# Patient Record
Sex: FEMALE | Employment: UNEMPLOYED | URBAN - METROPOLITAN AREA
[De-identification: names, ages, dates, MRNs, and addresses within clinical notes are randomized per-mention and may not be internally consistent; named-entity substitution may affect disease eponyms.]

---

## 2017-07-15 ENCOUNTER — TRANSFERRED RECORDS (OUTPATIENT)
Dept: HEALTH INFORMATION MANAGEMENT | Facility: CLINIC | Age: 62
End: 2017-07-15

## 2017-08-04 ENCOUNTER — TRANSFERRED RECORDS (OUTPATIENT)
Dept: HEALTH INFORMATION MANAGEMENT | Facility: CLINIC | Age: 62
End: 2017-08-04

## 2017-09-07 ENCOUNTER — TRANSFERRED RECORDS (OUTPATIENT)
Dept: HEALTH INFORMATION MANAGEMENT | Facility: CLINIC | Age: 62
End: 2017-09-07

## 2017-10-19 ENCOUNTER — TRANSFERRED RECORDS (OUTPATIENT)
Dept: HEALTH INFORMATION MANAGEMENT | Facility: CLINIC | Age: 62
End: 2017-10-19

## 2018-02-02 ENCOUNTER — TRANSFERRED RECORDS (OUTPATIENT)
Dept: HEALTH INFORMATION MANAGEMENT | Facility: CLINIC | Age: 63
End: 2018-02-02

## 2018-02-14 ENCOUNTER — TRANSFERRED RECORDS (OUTPATIENT)
Dept: HEALTH INFORMATION MANAGEMENT | Facility: CLINIC | Age: 63
End: 2018-02-14

## 2018-04-17 ENCOUNTER — TRANSFERRED RECORDS (OUTPATIENT)
Dept: HEALTH INFORMATION MANAGEMENT | Facility: CLINIC | Age: 63
End: 2018-04-17

## 2018-04-18 ENCOUNTER — TRANSFERRED RECORDS (OUTPATIENT)
Dept: HEALTH INFORMATION MANAGEMENT | Facility: CLINIC | Age: 63
End: 2018-04-18

## 2018-05-17 ENCOUNTER — TRANSFERRED RECORDS (OUTPATIENT)
Dept: HEALTH INFORMATION MANAGEMENT | Facility: CLINIC | Age: 63
End: 2018-05-17

## 2018-06-05 NOTE — TELEPHONE ENCOUNTER
FUTURE VISIT INFORMATION      FUTURE VISIT INFORMATION:    Date: 8/9/18    Time: 0800    Location: [facility]TH[facility]  REFERRAL INFORMATION:    Referring provider:  CELIO    Referring providers clinic:  [facility]    Reason for visit/diagnosis  SI JOINT       RECORDS STATUS      RECORDS RECEIVED FROM: [facility]   DATE RECEIVED: 6/5/18   NOTES STATUS DETAILS   OFFICE NOTE from referring provider Received 5/17/18*3/15/18*   OFFICE NOTE from other specialist Received 2/1/18*9/7/17*7/28/17*5/31/17*   DISCHARGE SUMMARY from hospital N/A    DISCHARGE REPORT from the ER N/A    OPERATIVE REPORT N/A    MEDICATION LIST Received    IMPLANT RECORD/STICKER N/A    LABS     CBC/DIFF N/A    CULTURES N/A    DEXA REQUESTED 4/17/18   MRI REQUESTED 8/4/17*7/15/17*6/24/13*   CT SCAN N/A    XRAYS (IMAGES & REPORTS) REQUESTED  9/7/17*6/18/13*   TUMOR     PATHOLOGY  Slides & report N/A

## 2018-08-03 DIAGNOSIS — M53.3 SACROILIAC JOINT PAIN: Primary | ICD-10-CM

## 2018-08-03 DIAGNOSIS — M40.209 KYPHOSIS: ICD-10-CM

## 2018-08-09 ENCOUNTER — RADIANT APPOINTMENT (OUTPATIENT)
Dept: GENERAL RADIOLOGY | Facility: CLINIC | Age: 63
End: 2018-08-09
Attending: ORTHOPAEDIC SURGERY
Payer: MEDICAID

## 2018-08-09 ENCOUNTER — OFFICE VISIT (OUTPATIENT)
Dept: ORTHOPEDICS | Facility: CLINIC | Age: 63
End: 2018-08-09
Payer: MEDICAID

## 2018-08-09 ENCOUNTER — PRE VISIT (OUTPATIENT)
Dept: ORTHOPEDICS | Facility: CLINIC | Age: 63
End: 2018-08-09

## 2018-08-09 VITALS — HEIGHT: 64 IN | BODY MASS INDEX: 38.65 KG/M2 | WEIGHT: 226.4 LBS

## 2018-08-09 DIAGNOSIS — M40.209 KYPHOSIS: ICD-10-CM

## 2018-08-09 DIAGNOSIS — M46.1 SACROILIITIS (H): Primary | ICD-10-CM

## 2018-08-09 DIAGNOSIS — M53.3 SACROILIAC JOINT PAIN: ICD-10-CM

## 2018-08-09 RX ORDER — TRIAMCINOLONE ACETONIDE 1 MG/G
OINTMENT TOPICAL
COMMUNITY
Start: 2018-05-21

## 2018-08-09 RX ORDER — AMLODIPINE BESYLATE 10 MG/1
TABLET ORAL
COMMUNITY
Start: 2017-11-16

## 2018-08-09 RX ORDER — SIMVASTATIN 20 MG
TABLET ORAL
COMMUNITY
Start: 2018-06-18

## 2018-08-09 RX ORDER — CLOPIDOGREL BISULFATE 75 MG/1
TABLET ORAL
COMMUNITY
Start: 2018-07-17

## 2018-08-09 RX ORDER — HYDROXYZINE HYDROCHLORIDE 25 MG/1
TABLET, FILM COATED ORAL
COMMUNITY
Start: 2018-06-13

## 2018-08-09 RX ORDER — CYPROHEPTADINE HYDROCHLORIDE 4 MG/1
4 TABLET ORAL
COMMUNITY
Start: 2018-08-02

## 2018-08-09 ASSESSMENT — ENCOUNTER SYMPTOMS
WEIGHT LOSS: 0
NECK PAIN: 1
HYPERTENSION: 1
STIFFNESS: 1
HALLUCINATIONS: 0
POLYDIPSIA: 0
MUSCLE CRAMPS: 0
MUSCLE WEAKNESS: 0
INCREASED ENERGY: 0
FEVER: 0
PALPITATIONS: 0
JOINT SWELLING: 0
HYPOTENSION: 0
WEIGHT GAIN: 0
MYALGIAS: 0
FATIGUE: 0
SLEEP DISTURBANCES DUE TO BREATHING: 0
CHILLS: 0
NIGHT SWEATS: 0
ARTHRALGIAS: 0
LIGHT-HEADEDNESS: 0
ORTHOPNEA: 0
SYNCOPE: 0
ALTERED TEMPERATURE REGULATION: 0
POLYPHAGIA: 0
EXERCISE INTOLERANCE: 0
DECREASED APPETITE: 0
BACK PAIN: 1
LEG PAIN: 1

## 2018-08-09 NOTE — NURSING NOTE
"Reason For Visit:   Chief Complaint   Patient presents with     Consult     Evaluation of SI joint fusion. Pain, lower and mid back pain. No hx of surgery.        Primary MD: No primary care provider on file.  Referring:   ANA ROSA PICKENS A    ?  No    Occupation: no  Currently working? No.  Work status?  On disability.    Date of injury: none  Type of injury: none.    Date of surgery: none  Type of surgery: none    Smoker: No  Request smoking cessation information: No    Ht 1.626 m (5' 4\")  Wt 102.7 kg (226 lb 6.4 oz)  BMI 38.86 kg/m2    Pain Assessment  Patient Currently in Pain: Yes  0-10 Pain Scale: 5  Primary Pain Location: Back  Pain Orientation: Lower, Mid  Pain Descriptors: Sharp, Throbbing  Alleviating Factors:  (Tylenol 3 )  Aggravating Factors: Walking, Standing    Oswestry (RACHEAL) Questionnaire    OSWESTRY DISABILITY INDEX 8/9/2018   Count 9   Sum 24   Oswestry Score (%) 53.33   Some recent data might be hidden                    Visual Analog Pain Scale  Back Pain Scale 0-10: 5  Right leg pain: 5 (back of thigh when walking)  Left leg pain: 5 (back of thigh when walking)    Promis 10 Assessment    PROMIS 10 8/9/2018   In general, would you say your health is: Good   In general, would you say your quality of life is: Good   In general, how would you rate your physical health? Good   In general, how would you rate your mental health, including your mood and your ability to think? Good   In general, how would you rate your satisfaction with your social activities and relationships? Good   In general, please rate how well you carry out your usual social activities and roles Fair   To what extent are you able to carry out your everyday physical activities such as walking, climbing stairs, carrying groceries, or moving a chair? A little   How often have you been bothered by emotional problems such as feeling anxious, depressed or irritable? Never   How would you rate your fatigue on average? " Moderate   How would you rate your pain on average?   0 = No Pain  to  10 = Worst Imaginable Pain 5   In general, would you say your health is: 3   In general, would you say your quality of life is: 3   In general, how would you rate your physical health? 3   In general, how would you rate your mental health, including your mood and your ability to think? 3   In general, how would you rate your satisfaction with your social activities and relationships? 3   In general, please rate how well you carry out your usual social activities and roles. (This includes activities at home, at work and in your community, and responsibilities as a parent, child, spouse, employee, friend, etc.) 2   To what extent are you able to carry out your everyday physical activities such as walking, climbing stairs, carrying groceries, or moving a chair? 2   In the past 7 days, how often have you been bothered by emotional problems such as feeling anxious, depressed, or irritable? 1   In the past 7 days, how would you rate your fatigue on average? 3   In the past 7 days, how would you rate your pain on average, where 0 means no pain, and 10 means worst imaginable pain? 5   Global Mental Health Score 14   Global Physical Health Score 11   PROMIS TOTAL - SUBSCORES 25   Some recent data might be hidden                Raven Tyson LPN

## 2018-08-09 NOTE — PROGRESS NOTES
REASON FOR CONSULTATION: Consult (Evaluation of SI joint fusion. Pain, lower and mid back pain. No hx of surgery. )     REFERRING PHYSICIAN: Ze Palmer     PRIMARY CARE PHYSICIAN: No primary care provider on file.    HISTORY OF PRESENT ILLNESS: Joyce Jason is a 62 year old female who presents for evaluation of the above.  The patient has had about 1 year of low back pain sustained after a fall.  She was evaluated and no fracture was identified.  The pain is located in the low back and the lumbosacral junction, mainly on the right.  It extends down to the buttocks.  Occasionally it extends down the posterior thighs to the knees but not below the knees.  This is when she was walking.  It resolves when she sits or bends over her walker.  She has difficulty standing to do the dishes.  She reports that 80% of her problem is low back pain, and 20% pain in her thighs.  She has done physical therapy directed at her low back without significant relief.  She has had multiple injections in her lumbar spine as well as her SI joint.  She is unsure when she had the sacroiliac joint injection, and is unsure if it gave her any significant relief.  Per outside records, she did get % relief from a sacroiliac joint injection in the past.  These images are not available to us.  She is otherwise been managing her pain with over-the-counter pain medications.  She denies any numbness or tingling in her lower extremities.    REVIEW OF SYSTEMS:   10 point review systems negative unless noted above in the HPI.    Past medical, past surgical, social, family history, medications, and allergies reviewed on the orthopaedic surgery intake form which is scanned into the electronic record with pertinent positives commented on.    Allergies   Allergen Reactions     Adhesive Tape Blisters     Adhesives     Lisinopril Other (See Comments)     Latex Rash     adhesive       PMH: Hypertension, hyperlipidemia, Bell's palsy, TIA    PSH:  Multiple bilateral foot surgeries including revision midfoot fusion earlier this year.  Bilateral total hip arthroplasties.  Left shoulder surgery.    Family history: Negative for any problems anesthesia, bleeding or clotting.    Social history: Patient lives in [place] by herself.  She is retired.  She uses a walker to ambulate for balance.  She quit smoking in June.  She denies any alcohol or drug use.    Current Outpatient Prescriptions   Medication     amLODIPine (NORVASC) 10 MG tablet     aspirin 81 MG EC tablet     cholecalciferol (VITAMIN D3) 1000 UNIT tablet     clopidogrel (PLAVIX) 75 MG tablet     cyproheptadine (PERIACTIN) 4 MG tablet     hydrOXYzine (ATARAX) 25 MG tablet     ranitidine (ZANTAC) 150 MG tablet     simvastatin (ZOCOR) 20 MG tablet     TRAZODONE HCL PO     triamcinolone (KENALOG) 0.1 % ointment     No current facility-administered medications for this visit.        PHYSICAL EXAM:   Constitutional - Patient is healthy, well-nourished and appears stated age.    BMI = 38.86   Respiratory - Patient is breathing normally and in no respiratory distress.   Skin - No suspicious rashes or lesions.   Psychiatric - Normal mood and affect.   Cardiovascular - Extremities are warm and well perfused.   Eyes - Visual acuity is normal to the written word.   ENT - Hearing intact to the spoken word.   GI - No abdominal distention.   Musculoskeletal -mildly wide-based waddling gait..                  Lumbar Spine:    Appearance -head is centered over the pelvis in both the coronal and sagittal planes.    Palpation - Non-tender to palpation    ROM -minimal pain with range of motion.    Motor -        LOWER EXTREMITY Left Right   Hip flexion 5/5 5/5   Knee flexion 5/5 5/5   Knee extension 5/5 5/5   Ankle dorsiflexion Ankle fusion, unable to test 5/5   Ankle plantarflexion  ankle fusion, unable to test 5/5   Great toe extension 5/5 5/5        Special tests -     Straight leg raise - Negative     Pain with hip ROM  - Negative     Neurologic - Sensation intact to light touch bilaterally L3-S1.      REFLEXES Left Right   Patella 1+ 1+   Ankle jerk 1+ 1+   Babinski absent absent            Sacroiliac exam:   positive John finger on right    positive PSIS tenderness on right   negative Gisselle   positive AP compression   positive Thigh thrust   positive Lateral compression   negative Gaenslen's    IMAGING: Standing full spine alignment films from today's date are reviewed.  Bilateral total hip arthroplasties present.  Hip is centered over the pelvis in the coronal plane in the sagittal plane.  Multilevel lumbar spondylosis.    CLINICAL ASSESSMENT:   62-year-old female with left sided low back pain, and exam consistent with left sacroiliitis.    DISCUSSION/PLAN:   Brandon Diez had a good discussion with the patient about her findings today.  We discussed that there are multiple pain generators that can cause low back pain including lumbar spine pathology, hip pathology, and sacroiliac joint.  The patient does have 3 out of 5 positive sacroiliac joint exam findings.  Given this she may have a significant contribution of her pain from her left sacroiliac joint.  We will need to confirm this with the CT-guided left sacroiliac joint injection.  The patient was encouraged to avoid pain medication, and do activities that would aggravate her pain prior to the injection, to ensure that it would be helpful.  If this is helpful for her, a sacroiliac joint fusion could be considered.  Dr. Diez also discussed weight loss with the patient, discussing that significant weight loss would likely help her low back pain.  Patient will follow-up after her sacroiliac joint injection.  Patient was in agreement this plan and all questions were answered.    The patient was seen and discussed with staff surgeon Dr. Diez who was in agreement with the above assessment and plan.     Thomas Eastman MD  PGY-4  Orthopaedic Surgery  [phone]    I saw and  evaluated the patient on the day of the visit and I formulated the plan.  Fadi Diez MD          CC  Copy to patient     [address]  [address]  [place] [address]      Answers for HPI/ROS submitted by the patient on 8/9/2018   General Symptoms: Yes  Skin Symptoms: Yes  HENT Symptoms: No  EYE SYMPTOMS: No  HEART SYMPTOMS: Yes  LUNG SYMPTOMS: No  INTESTINAL SYMPTOMS: No  URINARY SYMPTOMS: No  GYNECOLOGIC SYMPTOMS: No  BREAST SYMPTOMS: No  SKELETAL SYMPTOMS: Yes  BLOOD SYMPTOMS: No  NERVOUS SYSTEM SYMPTOMS: No  MENTAL HEALTH SYMPTOMS: No  Fever: No  Loss of appetite: No  Weight loss: No  Weight gain: No  Fatigue: No  Night sweats: No  Chills: No  Increased stress: No  Excessive hunger: No  Excessive thirst: No  Feeling hot or cold when others believe the temperature is normal: No  Loss of height: No  Post-operative complications: No  Surgical site pain: No  Hallucinations: No  Change in or Loss of Energy: No  Hyperactivity: No  Confusion: No  Chest pain or pressure: No  Fast or irregular heartbeat: No  Pain in legs with walking: Yes  Trouble breathing while lying down: No  Fingers or toes appear blue: No  High blood pressure: Yes  Low blood pressure: No  Fainting: No  Murmurs: No  Pacemaker: No  Varicose veins: No  Edema or swelling: No  Wake up at night with shortness of breath: No  Light-headedness: No  Exercise intolerance: No  Back pain: Yes  Muscle aches: No  Neck pain: Yes  Swollen joints: No  Joint pain: No  Bone pain: Yes  Muscle cramps: No  Muscle weakness: No  Joint stiffness: Yes  Bone fracture: No  PHQ-2 Score: 0

## 2018-08-09 NOTE — LETTER
8/9/2018       RE: Joyce Jason  [address]  [address]  [place] [address]     Dear Colleague,    Thank you for referring your patient, Joyce Jason, to [facility]. Please see a copy of my visit note below.    REASON FOR CONSULTATION: Consult (Evaluation of SI joint fusion. Pain, lower and mid back pain. No hx of surgery. )     REFERRING PHYSICIAN: Ze Palmer     PRIMARY CARE PHYSICIAN: No primary care provider on file.    HISTORY OF PRESENT ILLNESS: Joyce Jason is a 62 year old female who presents for evaluation of the above.  The patient has had about 1 year of low back pain sustained after a fall.  She was evaluated and no fracture was identified.  The pain is located in the low back and the lumbosacral junction, mainly on the right.  It extends down to the buttocks.  Occasionally it extends down the posterior thighs to the knees but not below the knees.  This is when she was walking.  It resolves when she sits or bends over her walker.  She has difficulty standing to do the dishes.  She reports that 80% of her problem is low back pain, and 20% pain in her thighs.  She has done physical therapy directed at her low back without significant relief.  She has had multiple injections in her lumbar spine as well as her SI joint.  She is unsure when she had the sacroiliac joint injection, and is unsure if it gave her any significant relief.  Per outside records, she did get % relief from a sacroiliac joint injection in the past.  These images are not available to us.  She is otherwise been managing her pain with over-the-counter pain medications.  She denies any numbness or tingling in her lower extremities.    REVIEW OF SYSTEMS:   10 point review systems negative unless noted above in the HPI.    Past medical, past surgical, social, family history, medications, and allergies reviewed on the orthopaedic surgery intake  form which is scanned into the electronic record with pertinent positives commented on.    Allergies   Allergen Reactions     Adhesive Tape Blisters     Adhesives     Lisinopril Other (See Comments)     Latex Rash     adhesive       PMH: Hypertension, hyperlipidemia, Bell's palsy, TIA    PSH: Multiple bilateral foot surgeries including revision midfoot fusion earlier this year.  Bilateral total hip arthroplasties.  Left shoulder surgery.    Family history: Negative for any problems anesthesia, bleeding or clotting.    Social history: Patient lives in [place] by herself.  She is retired.  She uses a walker to ambulate for balance.  She quit smoking in June.  She denies any alcohol or drug use.    Current Outpatient Prescriptions   Medication     amLODIPine (NORVASC) 10 MG tablet     aspirin 81 MG EC tablet     cholecalciferol (VITAMIN D3) 1000 UNIT tablet     clopidogrel (PLAVIX) 75 MG tablet     cyproheptadine (PERIACTIN) 4 MG tablet     hydrOXYzine (ATARAX) 25 MG tablet     ranitidine (ZANTAC) 150 MG tablet     simvastatin (ZOCOR) 20 MG tablet     TRAZODONE HCL PO     triamcinolone (KENALOG) 0.1 % ointment     No current facility-administered medications for this visit.        PHYSICAL EXAM:   Constitutional - Patient is healthy, well-nourished and appears stated age.    BMI = 38.86   Respiratory - Patient is breathing normally and in no respiratory distress.   Skin - No suspicious rashes or lesions.   Psychiatric - Normal mood and affect.   Cardiovascular - Extremities are warm and well perfused.   Eyes - Visual acuity is normal to the written word.   ENT - Hearing intact to the spoken word.   GI - No abdominal distention.   Musculoskeletal -mildly wide-based waddling gait..                  Lumbar Spine:    Appearance -head is centered over the pelvis in both the coronal and sagittal planes.    Palpation - Non-tender to palpation    ROM -minimal pain with range of motion.    Motor -        LOWER EXTREMITY Left  Right   Hip flexion 5/5 5/5   Knee flexion 5/5 5/5   Knee extension 5/5 5/5   Ankle dorsiflexion Ankle fusion, unable to test 5/5   Ankle plantarflexion  ankle fusion, unable to test 5/5   Great toe extension 5/5 5/5        Special tests -     Straight leg raise - Negative     Pain with hip ROM - Negative     Neurologic - Sensation intact to light touch bilaterally L3-S1.      REFLEXES Left Right   Patella 1+ 1+   Ankle jerk 1+ 1+   Babinski absent absent            Sacroiliac exam:   positive John finger on right    positive PSIS tenderness on right   negative Gisselle   positive AP compression   positive Thigh thrust   positive Lateral compression   negative Gaenslen's    IMAGING: Standing full spine alignment films from today's date are reviewed.  Bilateral total hip arthroplasties present.  Hip is centered over the pelvis in the coronal plane in the sagittal plane.  Multilevel lumbar spondylosis.    CLINICAL ASSESSMENT:   62-year-old female with left sided low back pain, and exam consistent with left sacroiliitis.    DISCUSSION/PLAN:   Brandon Diez had a good discussion with the patient about her findings today.  We discussed that there are multiple pain generators that can cause low back pain including lumbar spine pathology, hip pathology, and sacroiliac joint.  The patient does have 3 out of 5 positive sacroiliac joint exam findings.  Given this she may have a significant contribution of her pain from her left sacroiliac joint.  We will need to confirm this with the CT-guided left sacroiliac joint injection.  The patient was encouraged to avoid pain medication, and do activities that would aggravate her pain prior to the injection, to ensure that it would be helpful.  If this is helpful for her, a sacroiliac joint fusion could be considered.  Dr. Diez also discussed weight loss with the patient, discussing that significant weight loss would likely help her low back pain.  Patient will follow-up after her  sacroiliac joint injection.  Patient was in agreement this plan and all questions were answered.    The patient was seen and discussed with staff surgeon Dr. Diez who was in agreement with the above assessment and plan.     Thomas Eastman MD  PGY-4  Orthopaedic Surgery  [phone]    I saw and evaluated the patient on the day of the visit and I formulated the plan.  Fadi Diez MD          CC  Copy to patient     [address]  [address]  [place] [address]

## 2023-06-22 NOTE — ADDENDUM NOTE
Addended by: JESSICA MORAN on: 8/8/2018 05:03 PM     Modules accepted: Orders    
Size Of Lesion In Cm (Optional): 0
Detail Level: Detailed
Instructions (Optional): Pt will have both ISKs frozen off next visit
Introduction Text (Please End With A Colon): The following procedure was deferred:

## 2023-06-23 ENCOUNTER — TELEPHONE (OUTPATIENT)
Dept: ORTHOPEDICS | Facility: CLINIC | Age: 68
End: 2023-06-23
Payer: COMMERCIAL

## 2023-06-26 ENCOUNTER — TELEPHONE (OUTPATIENT)
Dept: ORTHOPEDICS | Facility: CLINIC | Age: 68
End: 2023-06-26
Payer: COMMERCIAL

## 2025-02-11 DIAGNOSIS — M53.3 SI (SACROILIAC) JOINT DYSFUNCTION: Primary | ICD-10-CM

## 2025-02-25 ENCOUNTER — MEDICAL CORRESPONDENCE (OUTPATIENT)
Dept: HEALTH INFORMATION MANAGEMENT | Facility: CLINIC | Age: 70
End: 2025-02-25

## 2025-02-25 ENCOUNTER — OFFICE VISIT (OUTPATIENT)
Dept: ORTHOPEDICS | Facility: CLINIC | Age: 70
End: 2025-02-25
Payer: COMMERCIAL

## 2025-02-25 ENCOUNTER — ANCILLARY PROCEDURE (OUTPATIENT)
Dept: GENERAL RADIOLOGY | Facility: CLINIC | Age: 70
End: 2025-02-25
Attending: PHYSICIAN ASSISTANT
Payer: COMMERCIAL

## 2025-02-25 DIAGNOSIS — M40.36 FLATBACK SYNDROME, LUMBAR REGION: ICD-10-CM

## 2025-02-25 DIAGNOSIS — M53.3 SI (SACROILIAC) JOINT DYSFUNCTION: ICD-10-CM

## 2025-02-25 DIAGNOSIS — M81.0 AGE RELATED OSTEOPOROSIS, UNSPECIFIED PATHOLOGICAL FRACTURE PRESENCE: ICD-10-CM

## 2025-02-25 DIAGNOSIS — M48.062 LUMBAR STENOSIS WITH NEUROGENIC CLAUDICATION: Primary | ICD-10-CM

## 2025-02-25 PROCEDURE — 99204 OFFICE O/P NEW MOD 45 MIN: CPT | Performed by: PHYSICIAN ASSISTANT

## 2025-02-25 PROCEDURE — 1125F AMNT PAIN NOTED PAIN PRSNT: CPT | Performed by: PHYSICIAN ASSISTANT

## 2025-02-25 RX ORDER — FAMOTIDINE 20 MG/1
1 TABLET, FILM COATED ORAL 2 TIMES DAILY
COMMUNITY
Start: 2024-11-25

## 2025-02-25 RX ORDER — BUPROPION HYDROCHLORIDE 150 MG/1
150 TABLET, EXTENDED RELEASE ORAL 2 TIMES DAILY
COMMUNITY
Start: 2024-09-11

## 2025-02-25 RX ORDER — LISINOPRIL 10 MG/1
1 TABLET ORAL DAILY
COMMUNITY
Start: 2024-07-31

## 2025-02-25 RX ORDER — DOXEPIN HYDROCHLORIDE 10 MG/1
1 CAPSULE ORAL AT BEDTIME
COMMUNITY
Start: 2024-12-19

## 2025-02-25 RX ORDER — BRIMONIDINE TARTRATE 2 MG/ML
1 SOLUTION/ DROPS OPHTHALMIC DAILY
COMMUNITY
Start: 2024-12-31

## 2025-02-25 NOTE — LETTER
2/25/2025      Joyce Jason  [address]  [place] [address]      Dear Colleague,    Thank you for referring your patient, Joyce Jason, to the [facility] [place]. Please see a copy of my visit note below.    I have reviewed and updated the patient's Past Medical History, Social History, Family History and Medication List.    ALLERGIES  Adhesive tape, Lisinopril, and Latex    Spine Surgery Follow Up    REFERRING PHYSICIAN: No ref. provider found   PRIMARY CARE PHYSICIAN: System, Provider Not In           Chief Complaint:   No chief complaint on file.      History of Present Illness:  Symptom Profile Including: location of symptoms, onset, severity, exacerbating/alleviating factors, previous treatments:        Joyce Jason is a 69 year old female who presents today for routine Follow-up.  Patient is a former patient of Dr. Diez's, most recently seen by him 8/9/2018 at which time she was being evaluated for SI joint pain.  Assessment at that time was left sacroiliitis.  Discussed differential diagnosis of potential left buttock pain generators.  Plan was for CT-guided left SI joint injection, consideration of SI joint fusion in the future if needed.     Today, she is here to request repeat injection.  She reports that the SI joint injection that she had in 2018 was very helpful and lasted for a long time.  She feels the pain that she is having today is in the same distribution as previously, but has been worsening with time.  She reports pain across the beltline that is worse with standing and walking.  Improved with sitting or if she can lean forward on something.  She reports that she cannot walk without leaning forward on her wheeled walker due to the back pain.  Denies pain radiating into the legs.  Denies numbness and tingling in the legs.  Denies weakness in the legs.  Denies bowel/bladder incontinence or dysfunction.    Treatments tried:  Has  completed physical therapy in the past, none in the past few years.  Was not helpful to her.  Medications: Tylenol as needed  Injections: None recently.  Previously successful left SI joint injection    Tobacco use: Quit 2024.  Currently does not use any nicotine products.\  Walks with assistance of wheeled walker        RACHEAL Scores:  Oswestry (RACHEAL) Questionnaire        2/25/2025    12:06 PM   OSWESTRY DISABILITY INDEX   Count 9    Sum 26    Oswestry Score (%) 57.78 %        Patient-reported          PROMIS-10 Scores:  Global Mental Health Score: (Patient-Rptd) (P) 14  Global Physical Health Score: (Patient-Rptd) (P) 11  PROMIS TOTAL - SUBSCORES: (Patient-Rptd) (P) 25         Physical Exam:    Constitutional - Patient is healthy, well-nourished and appears stated age   Respiratory - Patient is breathing normally and in no respiratory distress.   Skin - No suspicious rashes or lesions.   Psychiatric - Normal mood and affect.   Cardiovascular - Extremities warm and well perfused.   Eyes - Visual acuity is normal to the written word.   ENT - Hearing intact to the spoken word.   GI - No abdominal distention.   Musculoskeletal - antalgic gait with use of wheeled walker.  Stands with positive sagittal imbalance.         Lumbar Spine:    Appearance -no surgical scars.    Localizes pain across beltline, slightly worse on the left    Palpation - nontender to palpation  greater trochanters,  midline. Tender to palpation bilateral QL, L>R PSIS    Motor -        LOWER EXTREMITY Left Right   Hip flexion 5/5 4/5   Knee flexion 5/5 5/5   Knee extension 5/5 5/5   Ankle dorsiflexion 5/5 5/5   Ankle plantarflexion 5/5 5/5   Great toe extension 5/5 5/5        Special tests -     Straight leg raise -negative     Pain with hip ROM -negative     Neurologic - Sensation intact to light touch bilaterally.      REFLEXES Left Right                  Patella 0+ 0+   Ankle jerk 0+ 0+   Babinski  Clonus Downgoing   0 beats Downgoing   0 beats          SI provocation tests:    Right Left   John Finger Test  - -   SUGEY  - +   Thigh Thrust: - +   Pelvic Compression Test  - -   Palpation  - +   Pelvic Gapping  - -   Gaenslen s Test  - -            Imaging:   I ordered and independently reviewed new radiographs at this clinic visit. The results were discussed with the patient.  Findings include:    2/25/2025 EOS full body standing AP/lateral view x-rays: Right TKA hardware present, possible haloing around tibial component.  Complex surgical hardware within right foot and left ankle.  Right ankle arthritic changes.  Bilateral BHARATI hardware present.  Left leg measured at 1.6 cm longer than right, but patient also appears to be standing with 1 knee flexed in her x-rays.  CVA 7.7 cm to the left.  Multilevel cervical, thoracic, lumbar degenerative changes, progressed compared to full spine x-rays from 2018.  Lumbar flatback.  Superior endplate compression deformities L2 and L3.  Somewhat difficult to visualize L1 superior endplate, but this appears to be wedged as well.  Sagittal measurements:  PI 33.5 degrees  LL approximately 7 degrees  L4-S1: 15.8 degrees  PT: 17 degrees  SVA +10.7 cm               Assessment and Plan:   Assessment:  69 year old female with chronic low back pain, lumbar flatback, neurogenic claudication symptoms, coronal and sagittal imbalance, L2 and L3 (and L1?) compression deformities (likely chronic, but new since 2018)     Plan:  Reviewed today's XR images with patient which demonstrate advanced degenerative changes, coronal imbalance, leg length discrepancy, lumbar flatback, L2 and L3 compression deformities.  She is complaining of back pain that is worse with standing and walking which is likely due to the flatback syndrome and likely lumbar spinal stenosis.  She has not had updated advanced imaging since 2017, so would recommend obtaining updated lumbar MRI without contrast.  We discussed that it is possible that the left SI joint is  partially contributing to her pain, but I think the majority of the pain is coming from the sagittal malalignment and stenosis.  We also discussed that it appears she has some new vertebral compression deformities, would recommend reaching out to her primary care provider to get updated DEXA, and be initiated on bone health medications if appropriate.  Continue with vitamin D and calcium supplementation.  We discussed nonoperative treatment options for her pain including physical therapy, medications, injections.  We can discuss injection options after obtaining the updated MRI.    - patient to reach out to PCP to discuss getting updated DEXA, continue vitamin D/ calcium supplementation  - Offered physical Therapy Referral, but patient declined  - Ordered MRI lumbar without contrast.   - Follow up in telephone visit (patient unable to do video visits, refused) to review results, discuss injection options, and other non-op treatment options    Respectfully,  Laura Lopez (a.k.a. Jeevan)LYN  Orthopaedic Spine Surgery  Dept Orthopaedic Surgery, [facility]  [facility] [place] [phone]    50 minutes spent on the date of the encounter doing chart review, review of outside records, review of test results, my own interpretation of tests, documentation, patient history, physical exam & discussion of plan with patient and family.    Dictation Disclaimer: Some of this Note has been completed with voice-recognition dictation software. Although errors are generally corrected real-time, there is the potential for a rare error to be present in the completed chart.      Again, thank you for allowing me to participate in the care of your patient.        Sincerely,        Laura Lopez PA-C    Electronically signed

## 2025-02-25 NOTE — PROGRESS NOTES
I have reviewed and updated the patient's Past Medical History, Social History, Family History and Medication List.    ALLERGIES  Adhesive tape, Lisinopril, and Latex    Spine Surgery Follow Up    REFERRING PHYSICIAN: No ref. provider found   PRIMARY CARE PHYSICIAN: System, Provider Not In           Chief Complaint:   No chief complaint on file.      History of Present Illness:  Symptom Profile Including: location of symptoms, onset, severity, exacerbating/alleviating factors, previous treatments:        Joyce Jason is a 69 year old female who presents today for routine Follow-up.  Patient is a former patient of Dr. Manzanaress, most recently seen by him 8/9/2018 at which time she was being evaluated for SI joint pain.  Assessment at that time was left sacroiliitis.  Discussed differential diagnosis of potential left buttock pain generators.  Plan was for CT-guided left SI joint injection, consideration of SI joint fusion in the future if needed.     Today, she is here to request repeat injection.  She reports that the SI joint injection that she had in 2018 was very helpful and lasted for a long time.  She feels the pain that she is having today is in the same distribution as previously, but has been worsening with time.  She reports pain across the beltline that is worse with standing and walking.  Improved with sitting or if she can lean forward on something.  She reports that she cannot walk without leaning forward on her wheeled walker due to the back pain.  Denies pain radiating into the legs.  Denies numbness and tingling in the legs.  Denies weakness in the legs.  Denies bowel/bladder incontinence or dysfunction.    Treatments tried:  Has completed physical therapy in the past, none in the past few years.  Was not helpful to her.  Medications: Tylenol as needed  Injections: None recently.  Previously successful left SI joint injection    Tobacco use: Quit 2024.  Currently does not use any nicotine  products.\  Walks with assistance of wheeled walker        RACHEAL Scores:  Oswestry (RACHEAL) Questionnaire        2/25/2025    12:06 PM   OSWESTRY DISABILITY INDEX   Count 9    Sum 26    Oswestry Score (%) 57.78 %        Patient-reported          PROMIS-10 Scores:  Global Mental Health Score: (Patient-Rptd) (P) 14  Global Physical Health Score: (Patient-Rptd) (P) 11  PROMIS TOTAL - SUBSCORES: (Patient-Rptd) (P) 25         Physical Exam:    Constitutional - Patient is healthy, well-nourished and appears stated age   Respiratory - Patient is breathing normally and in no respiratory distress.   Skin - No suspicious rashes or lesions.   Psychiatric - Normal mood and affect.   Cardiovascular - Extremities warm and well perfused.   Eyes - Visual acuity is normal to the written word.   ENT - Hearing intact to the spoken word.   GI - No abdominal distention.   Musculoskeletal - antalgic gait with use of wheeled walker.  Stands with positive sagittal imbalance.         Lumbar Spine:    Appearance -no surgical scars.    Localizes pain across beltline, slightly worse on the left    Palpation - nontender to palpation  greater trochanters,  midline. Tender to palpation bilateral QL, L>R PSIS    Motor -        LOWER EXTREMITY Left Right   Hip flexion 5/5 4/5   Knee flexion 5/5 5/5   Knee extension 5/5 5/5   Ankle dorsiflexion 5/5 5/5   Ankle plantarflexion 5/5 5/5   Great toe extension 5/5 5/5        Special tests -     Straight leg raise -negative     Pain with hip ROM -negative     Neurologic - Sensation intact to light touch bilaterally.      REFLEXES Left Right                  Patella 0+ 0+   Ankle jerk 0+ 0+   Babinski  Clonus Downgoing   0 beats Downgoing   0 beats         SI provocation tests:    Right Left   John Finger Test  - -   SUGEY  - +   Thigh Thrust: - +   Pelvic Compression Test  - -   Palpation  - +   Pelvic Gapping  - -   Gaenslen s Test  - -            Imaging:   I ordered and independently reviewed new  radiographs at this clinic visit. The results were discussed with the patient.  Findings include:    2/25/2025 EOS full body standing AP/lateral view x-rays: Right TKA hardware present, possible haloing around tibial component.  Complex surgical hardware within right foot and left ankle.  Right ankle arthritic changes.  Bilateral BHARATI hardware present.  Left leg measured at 1.6 cm longer than right, but patient also appears to be standing with 1 knee flexed in her x-rays.  CVA 7.7 cm to the left.  Multilevel cervical, thoracic, lumbar degenerative changes, progressed compared to full spine x-rays from 2018.  Lumbar flatback.  Superior endplate compression deformities L2 and L3.  Somewhat difficult to visualize L1 superior endplate, but this appears to be wedged as well.  Sagittal measurements:  PI 33.5 degrees  LL approximately 7 degrees  L4-S1: 15.8 degrees  PT: 17 degrees  SVA +10.7 cm               Assessment and Plan:   Assessment:  69 year old female with chronic low back pain, lumbar flatback, neurogenic claudication symptoms, coronal and sagittal imbalance, L2 and L3 (and L1?) compression deformities (likely chronic, but new since 2018)     Plan:  Reviewed today's XR images with patient which demonstrate advanced degenerative changes, coronal imbalance, leg length discrepancy, lumbar flatback, L2 and L3 compression deformities.  She is complaining of back pain that is worse with standing and walking which is likely due to the flatback syndrome and likely lumbar spinal stenosis.  She has not had updated advanced imaging since 2017, so would recommend obtaining updated lumbar MRI without contrast.  We discussed that it is possible that the left SI joint is partially contributing to her pain, but I think the majority of the pain is coming from the sagittal malalignment and stenosis.  We also discussed that it appears she has some new vertebral compression deformities, would recommend reaching out to her primary  care provider to get updated DEXA, and be initiated on bone health medications if appropriate.  Continue with vitamin D and calcium supplementation.  We discussed nonoperative treatment options for her pain including physical therapy, medications, injections.  We can discuss injection options after obtaining the updated MRI.    - patient to reach out to PCP to discuss getting updated DEXA, continue vitamin D/ calcium supplementation  - Offered physical Therapy Referral, but patient declined  - Ordered MRI lumbar without contrast.   - Follow up in telephone visit (patient unable to do video visits, refused) to review results, discuss injection options, and other non-op treatment options    Respectfully,  Laura Lopez (a.k.a. Billluis), LYN  Orthopaedic Spine Surgery  Dept Orthopaedic Surgery, [facility]  [facility] [place] [phone]    50 minutes spent on the date of the encounter doing chart review, review of outside records, review of test results, my own interpretation of tests, documentation, patient history, physical exam & discussion of plan with patient and family.    Dictation Disclaimer: Some of this Note has been completed with voice-recognition dictation software. Although errors are generally corrected real-time, there is the potential for a rare error to be present in the completed chart.